# Patient Record
Sex: FEMALE | Race: WHITE | NOT HISPANIC OR LATINO
[De-identification: names, ages, dates, MRNs, and addresses within clinical notes are randomized per-mention and may not be internally consistent; named-entity substitution may affect disease eponyms.]

---

## 2017-09-21 ENCOUNTER — RESULT REVIEW (OUTPATIENT)
Age: 62
End: 2017-09-21

## 2019-04-17 ENCOUNTER — RESULT REVIEW (OUTPATIENT)
Age: 64
End: 2019-04-17

## 2020-01-29 PROBLEM — Z00.00 ENCOUNTER FOR PREVENTIVE HEALTH EXAMINATION: Status: ACTIVE | Noted: 2020-01-29

## 2020-02-21 ENCOUNTER — APPOINTMENT (OUTPATIENT)
Dept: GYNECOLOGIC ONCOLOGY | Facility: CLINIC | Age: 65
End: 2020-02-21
Payer: MEDICARE

## 2020-02-21 VITALS
HEIGHT: 66 IN | WEIGHT: 164 LBS | SYSTOLIC BLOOD PRESSURE: 124 MMHG | TEMPERATURE: 97.9 F | BODY MASS INDEX: 26.36 KG/M2 | DIASTOLIC BLOOD PRESSURE: 80 MMHG

## 2020-02-21 DIAGNOSIS — Z78.9 OTHER SPECIFIED HEALTH STATUS: ICD-10-CM

## 2020-02-21 PROCEDURE — 99204 OFFICE O/P NEW MOD 45 MIN: CPT

## 2020-02-21 NOTE — DISCUSSION/SUMMARY
[FreeTextEntry1] : 64-year-old  ( x3), with atypical endometrial hyperplasia, now referred by Dr. Ibanez for surgical management. The patient is with history of right breast cancer. She status post lumpectomy and radiation. She is also on Aromatase inhibitors. She is requesting surgical management.\par The patient was also encouraged to have BRCA1,2 testing for the benefits of her kids.\par \par Options for surgical management discussed. Procedures offered patient included laparoscopic hysterectomy with bilateral salpingo-oophorectomy. She is requesting to preserve her ovaries if normal and is opting for  a TLH/BS.\par \par The risk benefits and alternative to the recommended treatments were discussed. She was informed about potential complications of surgery.  \par She showed clear understanding, was given the opportunity to ask questions and is amenable to the above surgical treatment. The patient will be setup for the above procedure in the near future.\par

## 2020-02-21 NOTE — OB HISTORY
[Total Preg ___] : : [unfilled] [Living ___] : [unfilled] (living) [Full Term ___] : [unfilled] (full-term) [Vaginal ___] : [unfilled] vaginal delivery(s) [Menopause  Age ____] : menopause occurred at age [unfilled] [Menarche Age ____] : age at menarche was [unfilled]

## 2020-02-21 NOTE — HISTORY OF PRESENT ILLNESS
[FreeTextEntry1] : 64-year-old  ( x3), with atypical endometrial hyperplasia, now referred by Dr. Ibanez for surgical management. The patient is with history of right breast cancer. She status post lumpectomy and radiation. She is also on Aromatase inhibitors. She is requesting surgical management.\par \par

## 2020-06-01 ENCOUNTER — OUTPATIENT (OUTPATIENT)
Dept: OUTPATIENT SERVICES | Facility: HOSPITAL | Age: 65
LOS: 1 days | Discharge: HOME | End: 2020-06-01
Payer: MEDICARE

## 2020-06-01 VITALS
TEMPERATURE: 97 F | OXYGEN SATURATION: 100 % | HEART RATE: 68 BPM | WEIGHT: 169.76 LBS | RESPIRATION RATE: 16 BRPM | DIASTOLIC BLOOD PRESSURE: 76 MMHG | HEIGHT: 66 IN | SYSTOLIC BLOOD PRESSURE: 142 MMHG

## 2020-06-01 DIAGNOSIS — Z85.3 PERSONAL HISTORY OF MALIGNANT NEOPLASM OF BREAST: ICD-10-CM

## 2020-06-01 DIAGNOSIS — Z01.818 ENCOUNTER FOR OTHER PREPROCEDURAL EXAMINATION: ICD-10-CM

## 2020-06-01 DIAGNOSIS — N85.00 ENDOMETRIAL HYPERPLASIA, UNSPECIFIED: ICD-10-CM

## 2020-06-01 DIAGNOSIS — Z98.890 OTHER SPECIFIED POSTPROCEDURAL STATES: Chronic | ICD-10-CM

## 2020-06-01 LAB
A1C WITH ESTIMATED AVERAGE GLUCOSE RESULT: 5.2 % — SIGNIFICANT CHANGE UP (ref 4–5.6)
ALBUMIN SERPL ELPH-MCNC: 4.7 G/DL — SIGNIFICANT CHANGE UP (ref 3.5–5.2)
ALP SERPL-CCNC: 87 U/L — SIGNIFICANT CHANGE UP (ref 30–115)
ALT FLD-CCNC: 31 U/L — SIGNIFICANT CHANGE UP (ref 0–41)
ANION GAP SERPL CALC-SCNC: 13 MMOL/L — SIGNIFICANT CHANGE UP (ref 7–14)
APPEARANCE UR: CLEAR — SIGNIFICANT CHANGE UP
APTT BLD: 29.8 SEC — SIGNIFICANT CHANGE UP (ref 27–39.2)
AST SERPL-CCNC: 23 U/L — SIGNIFICANT CHANGE UP (ref 0–41)
BASOPHILS # BLD AUTO: 0.06 K/UL — SIGNIFICANT CHANGE UP (ref 0–0.2)
BASOPHILS NFR BLD AUTO: 1.1 % — HIGH (ref 0–1)
BILIRUB SERPL-MCNC: 0.2 MG/DL — SIGNIFICANT CHANGE UP (ref 0.2–1.2)
BILIRUB UR-MCNC: NEGATIVE — SIGNIFICANT CHANGE UP
BLD GP AB SCN SERPL QL: SIGNIFICANT CHANGE UP
BUN SERPL-MCNC: 19 MG/DL — SIGNIFICANT CHANGE UP (ref 10–20)
CALCIUM SERPL-MCNC: 9.4 MG/DL — SIGNIFICANT CHANGE UP (ref 8.5–10.1)
CHLORIDE SERPL-SCNC: 99 MMOL/L — SIGNIFICANT CHANGE UP (ref 98–110)
CO2 SERPL-SCNC: 26 MMOL/L — SIGNIFICANT CHANGE UP (ref 17–32)
COLOR SPEC: YELLOW — SIGNIFICANT CHANGE UP
CREAT SERPL-MCNC: 0.7 MG/DL — SIGNIFICANT CHANGE UP (ref 0.7–1.5)
DIFF PNL FLD: NEGATIVE — SIGNIFICANT CHANGE UP
EOSINOPHIL # BLD AUTO: 0.12 K/UL — SIGNIFICANT CHANGE UP (ref 0–0.7)
EOSINOPHIL NFR BLD AUTO: 2.1 % — SIGNIFICANT CHANGE UP (ref 0–8)
ESTIMATED AVERAGE GLUCOSE: 103 MG/DL — SIGNIFICANT CHANGE UP (ref 68–114)
GLUCOSE SERPL-MCNC: 88 MG/DL — SIGNIFICANT CHANGE UP (ref 70–99)
GLUCOSE UR QL: NEGATIVE — SIGNIFICANT CHANGE UP
HCT VFR BLD CALC: 47 % — SIGNIFICANT CHANGE UP (ref 37–47)
HGB BLD-MCNC: 15.4 G/DL — SIGNIFICANT CHANGE UP (ref 12–16)
IMM GRANULOCYTES NFR BLD AUTO: 0.5 % — HIGH (ref 0.1–0.3)
INR BLD: 0.9 RATIO — SIGNIFICANT CHANGE UP (ref 0.65–1.3)
KETONES UR-MCNC: NEGATIVE — SIGNIFICANT CHANGE UP
LEUKOCYTE ESTERASE UR-ACNC: NEGATIVE — SIGNIFICANT CHANGE UP
LYMPHOCYTES # BLD AUTO: 1.24 K/UL — SIGNIFICANT CHANGE UP (ref 1.2–3.4)
LYMPHOCYTES # BLD AUTO: 21.9 % — SIGNIFICANT CHANGE UP (ref 20.5–51.1)
MCHC RBC-ENTMCNC: 32.2 PG — HIGH (ref 27–31)
MCHC RBC-ENTMCNC: 32.8 G/DL — SIGNIFICANT CHANGE UP (ref 32–37)
MCV RBC AUTO: 98.3 FL — SIGNIFICANT CHANGE UP (ref 81–99)
MONOCYTES # BLD AUTO: 0.39 K/UL — SIGNIFICANT CHANGE UP (ref 0.1–0.6)
MONOCYTES NFR BLD AUTO: 6.9 % — SIGNIFICANT CHANGE UP (ref 1.7–9.3)
NEUTROPHILS # BLD AUTO: 3.81 K/UL — SIGNIFICANT CHANGE UP (ref 1.4–6.5)
NEUTROPHILS NFR BLD AUTO: 67.5 % — SIGNIFICANT CHANGE UP (ref 42.2–75.2)
NITRITE UR-MCNC: NEGATIVE — SIGNIFICANT CHANGE UP
NRBC # BLD: 0 /100 WBCS — SIGNIFICANT CHANGE UP (ref 0–0)
PH UR: 6 — SIGNIFICANT CHANGE UP (ref 5–8)
PLATELET # BLD AUTO: 248 K/UL — SIGNIFICANT CHANGE UP (ref 130–400)
POTASSIUM SERPL-MCNC: 4.3 MMOL/L — SIGNIFICANT CHANGE UP (ref 3.5–5)
POTASSIUM SERPL-SCNC: 4.3 MMOL/L — SIGNIFICANT CHANGE UP (ref 3.5–5)
PROT SERPL-MCNC: 7.6 G/DL — SIGNIFICANT CHANGE UP (ref 6–8)
PROT UR-MCNC: NEGATIVE — SIGNIFICANT CHANGE UP
PROTHROM AB SERPL-ACNC: 10.4 SEC — SIGNIFICANT CHANGE UP (ref 9.95–12.87)
RBC # BLD: 4.78 M/UL — SIGNIFICANT CHANGE UP (ref 4.2–5.4)
RBC # FLD: 12.3 % — SIGNIFICANT CHANGE UP (ref 11.5–14.5)
SODIUM SERPL-SCNC: 138 MMOL/L — SIGNIFICANT CHANGE UP (ref 135–146)
SP GR SPEC: 1.02 — SIGNIFICANT CHANGE UP (ref 1.01–1.02)
UROBILINOGEN FLD QL: SIGNIFICANT CHANGE UP
WBC # BLD: 5.65 K/UL — SIGNIFICANT CHANGE UP (ref 4.8–10.8)
WBC # FLD AUTO: 5.65 K/UL — SIGNIFICANT CHANGE UP (ref 4.8–10.8)

## 2020-06-01 PROCEDURE — 71046 X-RAY EXAM CHEST 2 VIEWS: CPT | Mod: 26

## 2020-06-01 PROCEDURE — 93010 ELECTROCARDIOGRAM REPORT: CPT

## 2020-06-01 NOTE — H&P PST ADULT - ATTENDING COMMENTS
64-year-old  ( x3), with atypical endometrial hyperplasia, now admitted for further management. The patient is with history of right breast cancer. She is status post lumpectomy and radiation. She is requesting surgical management. Options for surgical management discussed. Procedures offered patient included laparoscopic hysterectomy with bilateral salpingo-oophorectomy. The risk benefits and alternative to the recommended treatments were discussed. She was informed about potential complications of surgery.   She showed clear understanding, was given the opportunity to ask questions and is amenable to the above surgical treatment.

## 2020-06-01 NOTE — H&P PST ADULT - REASON FOR ADMISSION
64 yr old female to past for total abdominal hysterectomy bilateral salpingooophorectomies frozen section h/o endometiral hyperplasia s/p d and c at [facility]

## 2020-06-01 NOTE — H&P PST ADULT - HISTORY OF PRESENT ILLNESS
pt with h/o right breast dcis s/p right lumpectomy 2019 s/p rad tx   on anastrozole 6 mos. Pt lmp 10 yrs   x 3. Pt denies any fever cough uri uti cp palpitations sob no n/v/d  or pain. Pt stated exercise tolerance is > 3 flights no changes walks daily. Vishnu screen revd negative. Pt for covid test preop 20 has appt pt denies any s/s covid or contact with known positive pts states she has been using ppe and practicing soc distancing states she feels in optimal level of health at this time for this procedure. Pt states saw pmd 4 mos ago and sent text to provider re surgery and will see pre op as requested by PMD

## 2020-06-01 NOTE — H&P PST ADULT - NSICDXPASTMEDICALHX_GEN_ALL_CORE_FT
PAST MEDICAL HISTORY:  Breast cancer 2019 rt lumpectomy dcis s/p rt on anastozole    HTN (hypertension) 2 yr    Osteoporosis

## 2020-06-01 NOTE — H&P PST ADULT - NSICDXPASTSURGICALHX_GEN_ALL_CORE_FT
PAST SURGICAL HISTORY:  History of dilatation and curettage 2020    S/P lumpectomy, right breast 2019

## 2020-06-02 LAB
CULTURE RESULTS: SIGNIFICANT CHANGE UP
SPECIMEN SOURCE: SIGNIFICANT CHANGE UP

## 2020-06-08 ENCOUNTER — OUTPATIENT (OUTPATIENT)
Dept: OUTPATIENT SERVICES | Facility: HOSPITAL | Age: 65
LOS: 1 days | Discharge: HOME | End: 2020-06-08
Payer: MEDICARE

## 2020-06-08 ENCOUNTER — APPOINTMENT (OUTPATIENT)
Dept: GYNECOLOGIC ONCOLOGY | Facility: HOSPITAL | Age: 65
End: 2020-06-08
Payer: MEDICARE

## 2020-06-08 ENCOUNTER — TRANSCRIPTION ENCOUNTER (OUTPATIENT)
Age: 65
End: 2020-06-08

## 2020-06-08 ENCOUNTER — RESULT REVIEW (OUTPATIENT)
Age: 65
End: 2020-06-08

## 2020-06-08 VITALS — WEIGHT: 164.91 LBS | HEIGHT: 65 IN

## 2020-06-08 VITALS
OXYGEN SATURATION: 99 % | RESPIRATION RATE: 18 BRPM | TEMPERATURE: 98 F | HEART RATE: 69 BPM | DIASTOLIC BLOOD PRESSURE: 58 MMHG | SYSTOLIC BLOOD PRESSURE: 110 MMHG

## 2020-06-08 DIAGNOSIS — Z98.890 OTHER SPECIFIED POSTPROCEDURAL STATES: Chronic | ICD-10-CM

## 2020-06-08 LAB
ABO RH CONFIRMATION: SIGNIFICANT CHANGE UP
GLUCOSE BLDC GLUCOMTR-MCNC: 176 MG/DL — HIGH (ref 70–99)
GLUCOSE BLDC GLUCOMTR-MCNC: 80 MG/DL — SIGNIFICANT CHANGE UP (ref 70–99)

## 2020-06-08 PROCEDURE — 58660 LAPAROSCOPY LYSIS: CPT

## 2020-06-08 PROCEDURE — 58571 TLH W/T/O 250 G OR LESS: CPT

## 2020-06-08 PROCEDURE — 88307 TISSUE EXAM BY PATHOLOGIST: CPT | Mod: 26

## 2020-06-08 PROCEDURE — 57283 COLPOPEXY INTRAPERITONEAL: CPT

## 2020-06-08 RX ORDER — AMLODIPINE BESYLATE AND OLMESARTRAN MEDOXOMIL 10; 40 MG/1; MG/1
1 TABLET, FILM COATED ORAL
Qty: 0 | Refills: 0 | DISCHARGE

## 2020-06-08 RX ORDER — SIMETHICONE 80 MG/1
1 TABLET, CHEWABLE ORAL
Qty: 180 | Refills: 0
Start: 2020-06-08 | End: 2020-07-07

## 2020-06-08 RX ORDER — HYDROMORPHONE HYDROCHLORIDE 2 MG/ML
0.5 INJECTION INTRAMUSCULAR; INTRAVENOUS; SUBCUTANEOUS
Refills: 0 | Status: DISCONTINUED | OUTPATIENT
Start: 2020-06-08 | End: 2020-06-08

## 2020-06-08 RX ORDER — FLUTICASONE PROPIONATE 50 MCG
2 SPRAY, SUSPENSION NASAL
Qty: 0 | Refills: 0 | DISCHARGE

## 2020-06-08 RX ORDER — DOCUSATE SODIUM 100 MG
1 CAPSULE ORAL
Qty: 60 | Refills: 0
Start: 2020-06-08

## 2020-06-08 RX ORDER — ACETAMINOPHEN 500 MG
1000 TABLET ORAL ONCE
Refills: 0 | Status: COMPLETED | OUTPATIENT
Start: 2020-06-08 | End: 2020-06-08

## 2020-06-08 RX ORDER — ACETAMINOPHEN 500 MG
2 TABLET ORAL
Qty: 240 | Refills: 0
Start: 2020-06-08 | End: 2020-07-07

## 2020-06-08 RX ORDER — DENOSUMAB 60 MG/ML
0 INJECTION SUBCUTANEOUS
Qty: 0 | Refills: 0 | DISCHARGE

## 2020-06-08 RX ORDER — ANASTROZOLE 1 MG/1
1 TABLET ORAL
Qty: 0 | Refills: 0 | DISCHARGE

## 2020-06-08 RX ORDER — GLUCOSAMINE/CHONDROITIN/C/MANG 500-400 MG
3 CAPSULE ORAL
Qty: 0 | Refills: 0 | DISCHARGE

## 2020-06-08 RX ORDER — SODIUM CHLORIDE 9 MG/ML
1000 INJECTION, SOLUTION INTRAVENOUS
Refills: 0 | Status: DISCONTINUED | OUTPATIENT
Start: 2020-06-08 | End: 2020-06-08

## 2020-06-08 RX ORDER — ONDANSETRON 8 MG/1
8 TABLET, FILM COATED ORAL ONCE
Refills: 0 | Status: DISCONTINUED | OUTPATIENT
Start: 2020-06-08 | End: 2020-06-08

## 2020-06-08 RX ORDER — CETIRIZINE HYDROCHLORIDE 10 MG/1
1 TABLET ORAL
Qty: 0 | Refills: 0 | DISCHARGE

## 2020-06-08 RX ORDER — OXYCODONE HYDROCHLORIDE 5 MG/1
1 TABLET ORAL
Qty: 10 | Refills: 0
Start: 2020-06-08

## 2020-06-08 RX ADMIN — SODIUM CHLORIDE 75 MILLILITER(S): 9 INJECTION, SOLUTION INTRAVENOUS at 11:58

## 2020-06-08 RX ADMIN — Medication 400 MILLIGRAM(S): at 11:45

## 2020-06-08 NOTE — BRIEF OPERATIVE NOTE - OPERATION/FINDINGS
exam under anesthesia: anteverted uterus sounded to 7cm   On laparoscopy: anteverted uterus with fibroids; B/L tubes/ovaries appeared normal. Cul-de-sac appeared normal.   Frozen section: benign pathology exam under anesthesia: anteverted uterus sounded to 7cm   On laparoscopy: anteverted uterus with small subserosal fibroids; B/L tubes/ovaries appeared normal. Cul-de-sac appeared normal.   Frozen section: benign pathology

## 2020-06-08 NOTE — DISCHARGE NOTE PROVIDER - NSDCMRMEDTOKEN_GEN_ALL_CORE_FT
Colace 100 mg oral capsule: 1 cap(s) orally 2 times a day   oxyCODONE 5 mg oral capsule: 1 cap(s) orally every 4 hours, As Needed -for severe pain MDD:6 tablets  simethicone 80 mg oral tablet: 1 tab(s) orally every 4 hours   Tylenol 325 mg oral tablet: 2 tab(s) orally every 6 hours

## 2020-06-08 NOTE — DISCHARGE NOTE PROVIDER - NSDCCPCAREPLAN_GEN_ALL_CORE_FT
PRINCIPAL DISCHARGE DIAGNOSIS  Diagnosis: Endometrial hyperplasia with atypia  Assessment and Plan of Treatment:

## 2020-06-08 NOTE — ASU DISCHARGE PLAN (ADULT/PEDIATRIC) - CARE PROVIDER_API CALL
[facility]  [address] [place] [address]  Phone: [phone]  Fax: [phone]  Established Patient  Follow Up Time: 1 week

## 2020-06-08 NOTE — BRIEF OPERATIVE NOTE - SPECIMENS
uterus with bilateral fallopian tubes and ovaries uterus, cervix, bilateral fallopian tubes and ovaries

## 2020-06-08 NOTE — DISCHARGE NOTE PROVIDER - HOSPITAL COURSE
Patient underwent an uncomplicated laparoscopic hysterectomy, bilateral salpingo-oophorectomy. She met criteria for discharged and was sent home POD 0 after voiding and ambulating and tolerating PO.

## 2020-06-08 NOTE — ASU DISCHARGE PLAN (ADULT/PEDIATRIC) - CLICK TO LAUNCH ORM
Pt went to the bathroom- unable to get urine sample but did have a BM. Pt is feeling better.  notified and states it is okay to go without the urine sample. Pt is not having any burning with urination.       Archana Bear RN  04/14/19 [id number]
.

## 2020-06-08 NOTE — BRIEF OPERATIVE NOTE - NSICDXBRIEFPROCEDURE_GEN_ALL_CORE_FT
PROCEDURES:  Lysis of adhesions, pelvic 08-Jun-2020 11:39:09  Ruth Ann Paredes  Uterosacral colpopexy 08-Jun-2020 11:38:55  Ruth Ann Paredes  Laparoscopic hysterectomy, total, with BSO, uterus 250 g or less 08-Jun-2020 11:38:45  Ruth Ann Paredes

## 2020-06-08 NOTE — DISCHARGE NOTE PROVIDER - NSDCCPTREATMENT_GEN_ALL_CORE_FT
PRINCIPAL PROCEDURE  Procedure: Laparoscopic hysterectomy, total, with BSO, uterus 250 g or less  Findings and Treatment:

## 2020-06-08 NOTE — CHART NOTE - NSCHARTNOTEFT_GEN_A_CORE
PACU ANESTHESIA ADMISSION NOTE      Procedure: Lysis of adhesions, pelvic  Uterosacral colpopexy  Laparoscopic hysterectomy, total, with BSO, uterus 250 g or less    Post op diagnosis:  Atypical endometrial hyperplasia      ____  Intubated  TV:______       Rate: ______      FiO2: ______    __x__  Patent Airway    __x__  Full return of protective reflexes    __x__  Full recovery from anesthesia / back to baseline status    Vitals:  HR: 75  BP: 111/59  RR: 18  O2 Sat: 98  Temp: 97    Mental Status:  _x___ Awake   _____ Alert   __x___ Drowsy   _____ Sedated    Nausea/Vomiting:  _x__ NO  ______Yes,   See Post - Op Orders          Pain Scale (0-10):  __0___    Treatment: ____ None    ____ See Post - Op/PCA Orders    Post - Operative Fluids:   ____ Oral   ____ See Post - Op Orders    Plan: Discharge:   ___x_Home       _____Floor     _____Critical Care   Other:_________________    Comments: Patient had smooth intraoperative event, no anesthesia complication.

## 2020-06-08 NOTE — DISCHARGE NOTE PROVIDER - NSDCFUADDINST_GEN_ALL_CORE_FT
No heavy lifting.   Nothing inside the vagina for 6 weeks: no tampons, douching, sexual intercourse, tub baths or pools.   If you have a fever over 100.4F, severe abdominal pain or heavy vaginal bleeding please call your doctor or go to the emergency room.  Please follow up with your surgeon in 2 weeks for a postoperative visit.

## 2020-06-08 NOTE — PRE-ANESTHESIA EVALUATION ADULT - NSANTHADDINFOFT_GEN_ALL_CORE
Procedure/Risks explained for GA with ETT + routine monitoring. Patient undersstands and agrees to proceed as planned.

## 2020-06-08 NOTE — DISCHARGE NOTE PROVIDER - CARE PROVIDER_API CALL
[facility]  [address] [place] [id number]  Phone: [phone]  Fax: [phone]  Follow Up Time: 2 weeks

## 2020-06-09 LAB — SURGICAL PATHOLOGY STUDY: SIGNIFICANT CHANGE UP

## 2020-06-10 DIAGNOSIS — D25.2 SUBSEROSAL LEIOMYOMA OF UTERUS: ICD-10-CM

## 2020-06-10 DIAGNOSIS — N80.0 ENDOMETRIOSIS OF UTERUS: ICD-10-CM

## 2020-06-10 DIAGNOSIS — Z85.3 PERSONAL HISTORY OF MALIGNANT NEOPLASM OF BREAST: ICD-10-CM

## 2020-06-10 DIAGNOSIS — Z88.0 ALLERGY STATUS TO PENICILLIN: ICD-10-CM

## 2020-06-10 DIAGNOSIS — Z88.2 ALLERGY STATUS TO SULFONAMIDES: ICD-10-CM

## 2020-06-10 DIAGNOSIS — N85.02 ENDOMETRIAL INTRAEPITHELIAL NEOPLASIA [EIN]: ICD-10-CM

## 2020-06-10 DIAGNOSIS — N85.8 OTHER SPECIFIED NONINFLAMMATORY DISORDERS OF UTERUS: ICD-10-CM

## 2020-06-10 DIAGNOSIS — D25.0 SUBMUCOUS LEIOMYOMA OF UTERUS: ICD-10-CM

## 2020-06-10 DIAGNOSIS — I10 ESSENTIAL (PRIMARY) HYPERTENSION: ICD-10-CM

## 2020-06-10 DIAGNOSIS — D25.1 INTRAMURAL LEIOMYOMA OF UTERUS: ICD-10-CM

## 2020-06-17 PROBLEM — I10 ESSENTIAL (PRIMARY) HYPERTENSION: Chronic | Status: ACTIVE | Noted: 2020-06-01

## 2020-06-17 PROBLEM — C50.919 MALIGNANT NEOPLASM OF UNSPECIFIED SITE OF UNSPECIFIED FEMALE BREAST: Chronic | Status: ACTIVE | Noted: 2020-06-01

## 2020-06-17 PROBLEM — M81.0 AGE-RELATED OSTEOPOROSIS WITHOUT CURRENT PATHOLOGICAL FRACTURE: Chronic | Status: ACTIVE | Noted: 2020-06-01

## 2020-06-23 ENCOUNTER — APPOINTMENT (OUTPATIENT)
Dept: GYNECOLOGIC ONCOLOGY | Facility: CLINIC | Age: 65
End: 2020-06-23
Payer: MEDICARE

## 2020-06-23 VITALS
BODY MASS INDEX: 26.36 KG/M2 | TEMPERATURE: 98.9 F | SYSTOLIC BLOOD PRESSURE: 128 MMHG | DIASTOLIC BLOOD PRESSURE: 80 MMHG | WEIGHT: 164 LBS | HEIGHT: 66 IN

## 2020-06-23 PROCEDURE — 99024 POSTOP FOLLOW-UP VISIT: CPT

## 2020-06-23 NOTE — ASSESSMENT
[FreeTextEntry1] : 64-year-old  ( x3), with atypical endometrial hyperplasia,  referred by Dr. Ibanez for surgical management. The patient is with history of right breast cancer. She status post lumpectomy and radiation. She is also on Aromatase inhibitors. She is s/p surgical management with a TLH/BSO on 2020.  She appears to be recovering well.  Her Path report is benign. \par \par Final Diagnosis\par Uterus, cervix, bilateral tubes and ovaries, laparoscopic total\par abdominal hysterectomy, bilateral salpingo-oophorectomy:\par -  Atrophic endometrium.\par -  Adenomyosis.\par -  Multiple leiomyomas, intramural, submucosal and subserosal,\par with degenerative changes.\par -  No evidence of endometrial hyperplasia in the entirely\par submitted endometrial lining.\par -  Cervix without significant diagnostic change.\par -  Bilateral ovaries and fallopian tubes without significant\par diagnostic change (see comment).\par \par Comment:  The grossly described lesion of the right ovary\par corresponds to\par hyalinized corpora albicantia with calcifications.\par \par Verified by: Melissa Hallman M.D.\par

## 2020-06-23 NOTE — LETTER BODY
[I recently saw our patient [unfilled] for a follow-up visit.] : I recently saw our patient, [unfilled] for a follow-up visit. [Dear  ___] : Dear  [unfilled], [Attached please find my note.] : Attached please find my note.

## 2020-06-23 NOTE — DISCUSSION/SUMMARY
[Clean] : was clean [Dry] : was dry [Intact] : was intact [Erythema] : was not erythematous [Ecchymosis] : was not ecchymotic [Seroma] : had no seroma [None] : had no drainage [Firm] : soft [Normal Skin] : normal appearance [Abnormal Bowel Sounds] : normal bowel sounds [Tender] : nontender [Guarding] : no guarding [Rebound] : no rebound tenderness [Incisional Hernia] : no incisional hernia [External Genitalia Abnormal] : normal external genitalia [Vaginal Exam Abnormal] : normal vaginal exam [Doing Well] : is doing well [Excellent Pain Control] : has excellent pain control [0] : 0

## 2020-06-23 NOTE — REASON FOR VISIT
[Post Op] : post op visit [de-identified] : 6/8/20 [de-identified] : TLH/BSO [de-identified] : Underwent risk reducing TLH/BSO on May 8, 2020 with benign pathology.

## 2020-07-23 ENCOUNTER — APPOINTMENT (OUTPATIENT)
Dept: OBGYN | Facility: CLINIC | Age: 65
End: 2020-07-23
Payer: MEDICARE

## 2020-07-23 PROCEDURE — 81002 URINALYSIS NONAUTO W/O SCOPE: CPT

## 2020-07-23 PROCEDURE — 99024 POSTOP FOLLOW-UP VISIT: CPT

## 2020-09-15 ENCOUNTER — APPOINTMENT (OUTPATIENT)
Dept: GYNECOLOGIC ONCOLOGY | Facility: CLINIC | Age: 65
End: 2020-09-15
Payer: MEDICARE

## 2020-09-15 VITALS
SYSTOLIC BLOOD PRESSURE: 126 MMHG | WEIGHT: 164 LBS | BODY MASS INDEX: 26.36 KG/M2 | DIASTOLIC BLOOD PRESSURE: 82 MMHG | HEIGHT: 66 IN | TEMPERATURE: 97.4 F

## 2020-09-15 PROCEDURE — 99213 OFFICE O/P EST LOW 20 MIN: CPT

## 2020-09-15 NOTE — HISTORY OF PRESENT ILLNESS
[FreeTextEntry1] : 65 year old patient of Dr. Ibanez here for followup.  History of right breast cancer.   S/P TLH/BSO for fibroids, endometrial hyperplasia and  risk reduction. Pathology benign.  Patient continues to take Arimidex tabs.  No c/o vaginal bleeding, discharge or pain.   Returns to office for health maintenance visit.

## 2020-09-15 NOTE — LETTER BODY
[Dear  ___] : Dear  [unfilled], [Attached please find my note.] : Attached please find my note. [I recently saw our patient [unfilled] for a follow-up visit.] : I recently saw our patient, [unfilled] for a follow-up visit.

## 2020-09-15 NOTE — ASSESSMENT
[FreeTextEntry1] : MARKELL MÉNDEZ Underwent risk reducing TLH/BSO on May 8, 2020 with benign pathology,  she is here for a post op visit . The patient is fully recovered and is referred back to Dr. Ibanez for further management and follow up.\par

## 2021-09-13 NOTE — PAST MEDICAL HISTORY
[Postmenopausal] : The patient is postmenopausal [Menarche Age ____] : age at menarche was [unfilled] [Menopause Age____] : age at menopause was [unfilled] [Approximately ___] : the LMP was approximately [unfilled] [Total Preg ___] : G[unfilled] [Live Births ___] : P[unfilled]  [Full Term ___] : Full Term: [unfilled] [Living ___] : Living: [unfilled]

## 2021-09-17 ENCOUNTER — APPOINTMENT (OUTPATIENT)
Dept: GYNECOLOGIC ONCOLOGY | Facility: CLINIC | Age: 66
End: 2021-09-17
Payer: MEDICARE

## 2021-09-17 VITALS
BODY MASS INDEX: 24.43 KG/M2 | HEIGHT: 66 IN | SYSTOLIC BLOOD PRESSURE: 130 MMHG | WEIGHT: 152 LBS | TEMPERATURE: 96.3 F | DIASTOLIC BLOOD PRESSURE: 72 MMHG

## 2021-09-17 PROCEDURE — 99214 OFFICE O/P EST MOD 30 MIN: CPT

## 2021-09-17 NOTE — ASSESSMENT
[FreeTextEntry1] : 66 year old patient of Dr. Barber Ibanez. History of right breast cancer. S/P risk reducing TLH/BSO on May 8, 2020 with benign pathology. She continues to take Arimidex tabs. Denies any vaginal bleeding, discharge or pain. The patient appears to be doing well with no clinical evidence of disease.\par

## 2021-09-17 NOTE — HISTORY OF PRESENT ILLNESS
[FreeTextEntry1] : 66 year old patient of Dr. Barber Ibanez, with a history of right breast cancer,  S/P risk reducing TLH/BSO on May 8, 2020 with benign pathology.  She continues to take Arimidex tabs.   Denies any vaginal bleeding, discharge or pain.  She returns to office for health maintenance visit.

## 2022-07-29 NOTE — ASU PATIENT PROFILE, ADULT - NS PRO LAST MENSTRUAL
not applicable Cyclosporine Counseling:  I discussed with the patient the risks of cyclosporine including but not limited to hypertension, gingival hyperplasia,myelosuppression, immunosuppression, liver damage, kidney damage, neurotoxicity, lymphoma, and serious infections. The patient understands that monitoring is required including baseline blood pressure, CBC, CMP, lipid panel and uric acid, and then 1-2 times monthly CMP and blood pressure.

## 2022-09-30 ENCOUNTER — APPOINTMENT (OUTPATIENT)
Dept: UROGYNECOLOGY | Facility: CLINIC | Age: 67
End: 2022-09-30

## 2022-09-30 VITALS
WEIGHT: 158 LBS | DIASTOLIC BLOOD PRESSURE: 73 MMHG | SYSTOLIC BLOOD PRESSURE: 123 MMHG | HEIGHT: 66 IN | HEART RATE: 63 BPM | BODY MASS INDEX: 25.39 KG/M2

## 2022-09-30 DIAGNOSIS — Z85.3 PERSONAL HISTORY OF MALIGNANT NEOPLASM OF BREAST: ICD-10-CM

## 2022-09-30 DIAGNOSIS — I10 ESSENTIAL (PRIMARY) HYPERTENSION: ICD-10-CM

## 2022-09-30 DIAGNOSIS — Z86.19 PERSONAL HISTORY OF OTHER INFECTIOUS AND PARASITIC DISEASES: ICD-10-CM

## 2022-09-30 DIAGNOSIS — Z82.49 FAMILY HISTORY OF ISCHEMIC HEART DISEASE AND OTHER DISEASES OF THE CIRCULATORY SYSTEM: ICD-10-CM

## 2022-09-30 DIAGNOSIS — N85.00 ENDOMETRIAL HYPERPLASIA, UNSPECIFIED: ICD-10-CM

## 2022-09-30 DIAGNOSIS — Z83.42 FAMILY HISTORY OF FAMILIAL HYPERCHOLESTEROLEMIA: ICD-10-CM

## 2022-09-30 DIAGNOSIS — N85.01 BENIGN ENDOMETRIAL HYPERPLASIA: ICD-10-CM

## 2022-09-30 DIAGNOSIS — R30.1 VESICAL TENESMUS: ICD-10-CM

## 2022-09-30 DIAGNOSIS — Z87.440 PERSONAL HISTORY OF URINARY (TRACT) INFECTIONS: ICD-10-CM

## 2022-09-30 DIAGNOSIS — Z90.710 ACQUIRED ABSENCE OF BOTH CERVIX AND UTERUS: ICD-10-CM

## 2022-09-30 DIAGNOSIS — B37.9 CANDIDIASIS, UNSPECIFIED: ICD-10-CM

## 2022-09-30 PROCEDURE — 51701 INSERT BLADDER CATHETER: CPT

## 2022-09-30 PROCEDURE — 99205 OFFICE O/P NEW HI 60 MIN: CPT | Mod: 25

## 2022-09-30 RX ORDER — MULTIVIT-MIN/FA/LYCOPEN/LUTEIN .4-300-25
TABLET ORAL
Refills: 0 | Status: ACTIVE | COMMUNITY

## 2022-09-30 RX ORDER — ANASTROZOLE TABLETS 1 MG/1
1 TABLET ORAL
Refills: 0 | Status: ACTIVE | COMMUNITY

## 2022-09-30 RX ORDER — NYSTATIN 100000 U/G
100000 OINTMENT TOPICAL 3 TIMES DAILY
Qty: 1 | Refills: 1 | Status: COMPLETED | COMMUNITY
Start: 2022-02-10 | End: 2022-09-30

## 2022-09-30 RX ORDER — NYSTATIN 100000 U/G
100000 OINTMENT TOPICAL 3 TIMES DAILY
Qty: 1 | Refills: 1 | Status: COMPLETED | COMMUNITY
Start: 2022-07-09 | End: 2022-09-30

## 2022-09-30 RX ORDER — FLUTICASONE PROPIONATE 50 MCG
SPRAY, SUSPENSION NASAL
Refills: 0 | Status: ACTIVE | COMMUNITY

## 2022-09-30 RX ORDER — ANASTROZOLE 1 MG/1
TABLET ORAL
Refills: 0 | Status: COMPLETED | COMMUNITY
End: 2022-09-30

## 2022-09-30 RX ORDER — CETIRIZINE HCL 10 MG
10 TABLET ORAL
Refills: 0 | Status: ACTIVE | COMMUNITY

## 2022-09-30 RX ORDER — BACILLUS COAGULANS/INULIN 1B-250 MG
CAPSULE ORAL
Refills: 0 | Status: ACTIVE | COMMUNITY

## 2022-09-30 RX ORDER — FLUCONAZOLE 150 MG/1
150 TABLET ORAL DAILY
Qty: 1 | Refills: 2 | Status: COMPLETED | COMMUNITY
Start: 2022-07-09 | End: 2022-09-30

## 2022-09-30 RX ORDER — AMLODIPINE BESYLATE 5 MG/1
5 TABLET ORAL
Refills: 0 | Status: COMPLETED | COMMUNITY
End: 2022-09-30

## 2022-09-30 RX ORDER — ACETAMINOPHEN 325 MG
TABLET ORAL
Refills: 0 | Status: ACTIVE | COMMUNITY

## 2022-09-30 RX ORDER — AMLODIPINE AND OLMESARTAN MEDOXOMIL 10; 20 MG/1; MG/1
10-20 TABLET ORAL
Refills: 0 | Status: ACTIVE | COMMUNITY

## 2022-09-30 RX ORDER — FLUCONAZOLE 150 MG/1
150 TABLET ORAL DAILY
Qty: 1 | Refills: 0 | Status: COMPLETED | COMMUNITY
Start: 2022-02-10 | End: 2022-09-30

## 2022-09-30 RX ORDER — CRANBERRY FRUIT EXTRACT 200 MG
CAPSULE ORAL
Refills: 0 | Status: ACTIVE | COMMUNITY

## 2022-09-30 RX ORDER — CALCIUM CITRATE/VITAMIN D3 315MG-6.25
TABLET ORAL
Refills: 0 | Status: ACTIVE | COMMUNITY

## 2022-09-30 NOTE — COUNSELING
[FreeTextEntry1] : \par We will notify you of the urine results if they are abnormal\par \par Please call the office if you feel like you have an infection so that we can arrange testing of your urine. If you keep getting infections then I will recommend further evaluation of your kidneys and bladder\par \par Please continue to drink at least 6 cups of plain water per day\par \par For 4-5 days, cut everything from the list out of your diet.\par \par After 4-5 days, add one item from the list back into your diet for 4-5 days.\par \par Only put one item back in at a time to see which food is causing you pain.\par \par Please take the azo as needed for the burning.\par \par If you continue to have flares of burning and pain with negative urine cultures, then we will consider starting gabapentin\par \par Schedule 3 month follow up with my PA, Maki (bladder pain)

## 2022-09-30 NOTE — PHYSICAL EXAM
[Chaperone Present] : A chaperone was present in the examining room during all aspects of the physical examination [FreeTextEntry1] : Void: 125 cc\par PVR: 30 cc\par Urethra was prepped in sterile fashion and then a sterile non- indwelling catheter (14F) was used by me to drain the bladder. The patient tolerated the procedure well.\par Indication: history of UTI\par \par Ap: -1  Bp: -1\par  \par Well healed incision: laparoscopic\par normal perineal sensation\par normal perineal reflexes\par - cough stress test\par + atrophy\par + urethral hypermobility\par bilateral levator ani spasm,  no tenderness\par no urethral tenderness\par no bladder tenderness\par no cuff tenderness\par surgically absent uterus and cervix\par 2/5 Kegel\par

## 2022-09-30 NOTE — HISTORY OF PRESENT ILLNESS
[FreeTextEntry1] : \par Pt with pelvic floor dysfunction here for urogynecologic evaluation. She describes: \par \par Chief PFD:  UTIs\par \par UTIs:\par Symptoms: urinary frequency, dysuria, last hematuria years ago, feels better after treatment, not related to sex\par Last symptoms in July 2022 and it was negative\par Last time urine culture was positive was a year ago\par Feels better with increasing her water intake and taking pyridium as needed\par \par Records reviewed:\par 10/4/2021: klebsiella R amp I macrobid\par 7/22/2022: urine dip leukocytes 1+, no urine culture\par 2/24/2022: urine dip: trace blood, trace leukocytes, nitrite 3+, no urine culture\par \par Allergies:\par PCN/Keflex: hives\par Sulfa:hives\par \par Pelvic organ prolapse: s/p laparoscopic hysterectomy/BS0 (risk reduction), no bulge, denies splinting\par Stress urinary incontinence: denies\par Overactive bladder syndrome: denies\par Voiding dysfunction: Incomplete bladder emptying, hesitancy \par Lower urinary tract/vaginal symptoms: as above UTIs per year, no hematuria, no dysuria, no bladder pain \par Fecal incontinence: denies\par Defecatory dysfunction: sausage\par Sexual dysfunction: min active, bothersome dryness \par Pelvic pain: denies\par Vaginal dryness: hx of breast cancer, on arimidex, yes dryness\par \par Her pelvic floor symptoms are significantly bothersome and negatively impacting her quality of life. \par \par

## 2022-09-30 NOTE — DISCUSSION/SUMMARY
[FreeTextEntry1] : \par History of UTI-\par Advised the patient that recurrent UTIs are defined as having 3 or more positive urine culture in 1 year or 2 or more in 6 months, which she has not had. Advised to call the office if she feels like she has an infection so that we can arrange testing of her urine. If she keeps getting infections then I will recommend a workup of further evaluation of her kidneys and bladder and further prevention treatment including estrogen vaginal cream and daily antibiotic suppression. The patient voiced understanding and agrees with the plan.\par \par Atrophic vaginitis-\par We reviewed the risks, benefits, alternatives and indications of local estrogen therapy. She does NOT opt to begin this therapy. \par \par Painful bladder spasms-\par We reviewed the management options for interstitial cystitis/painful bladder syndrome, including dietary modification, over the counter medications for symptom relief, other medications including neurontin. The patient voiced understanding and agrees to continue with diet changes, azo as needed and if she continues to get flares she would then want to start neurontin.\par

## 2022-10-02 LAB
APPEARANCE: CLEAR
BACTERIA: NEGATIVE
BILIRUBIN URINE: NEGATIVE
BLOOD URINE: NEGATIVE
COLOR: NORMAL
GLUCOSE QUALITATIVE U: NEGATIVE
HYALINE CASTS: 0 /LPF
KETONES URINE: ABNORMAL
LEUKOCYTE ESTERASE URINE: NEGATIVE
MICROSCOPIC-UA: NORMAL
NITRITE URINE: NEGATIVE
PH URINE: 6
PROTEIN URINE: NEGATIVE
RED BLOOD CELLS URINE: 1 /HPF
SPECIFIC GRAVITY URINE: 1.02
SQUAMOUS EPITHELIAL CELLS: 0 /HPF
UROBILINOGEN URINE: NORMAL
WHITE BLOOD CELLS URINE: 1 /HPF

## 2022-10-03 ENCOUNTER — APPOINTMENT (OUTPATIENT)
Dept: OTOLARYNGOLOGY | Facility: CLINIC | Age: 67
End: 2022-10-03

## 2022-10-03 VITALS — WEIGHT: 158 LBS | HEIGHT: 66 IN | BODY MASS INDEX: 25.39 KG/M2

## 2022-10-03 LAB — URINE CULTURE <10: NORMAL

## 2022-10-03 PROCEDURE — 99205 OFFICE O/P NEW HI 60 MIN: CPT | Mod: 25

## 2022-10-03 PROCEDURE — 69210 REMOVE IMPACTED EAR WAX UNI: CPT

## 2022-10-03 NOTE — ASSESSMENT
[FreeTextEntry1] : I reviewed, interpreted, and discussed the Audiogram done on 8/1/22. Asymmetric snhl.\par \par \par

## 2022-10-03 NOTE — HISTORY OF PRESENT ILLNESS
[de-identified] : Patient presents today c/o left ear hearing loss .  Patient has been gradually having hearing loss in left ear .  Patient has a Audiogram done 08/01/22.  The report showed significant loss of hearing in left ear.  Patient denies any pain.  She also is here for clearance for a hearing aid.

## 2022-10-03 NOTE — PHYSICAL EXAM
[de-identified] : bilateral impacted wax cleaned with curette [Normal] : mucosa is normal [Midline] : trachea located in midline position

## 2023-01-13 ENCOUNTER — NON-APPOINTMENT (OUTPATIENT)
Age: 68
End: 2023-01-13

## 2023-04-04 ENCOUNTER — NON-APPOINTMENT (OUTPATIENT)
Age: 68
End: 2023-04-04

## 2023-04-06 ENCOUNTER — NON-APPOINTMENT (OUTPATIENT)
Age: 68
End: 2023-04-06

## 2023-04-06 RX ORDER — FLUCONAZOLE 150 MG/1
150 TABLET ORAL
Qty: 2 | Refills: 0 | Status: ACTIVE | COMMUNITY
Start: 2023-04-06 | End: 1900-01-01

## 2023-05-23 ENCOUNTER — NON-APPOINTMENT (OUTPATIENT)
Age: 68
End: 2023-05-23

## 2023-06-16 ENCOUNTER — APPOINTMENT (OUTPATIENT)
Dept: OBGYN | Facility: CLINIC | Age: 68
End: 2023-06-16
Payer: MEDICARE

## 2023-06-16 DIAGNOSIS — Z01.419 ENCOUNTER FOR GYNECOLOGICAL EXAMINATION (GENERAL) (ROUTINE) W/OUT ABNORMAL FINDINGS: ICD-10-CM

## 2023-06-16 DIAGNOSIS — N95.2 POSTMENOPAUSAL ATROPHIC VAGINITIS: ICD-10-CM

## 2023-06-16 DIAGNOSIS — Z85.3 PERSONAL HISTORY OF MALIGNANT NEOPLASM OF BREAST: ICD-10-CM

## 2023-06-16 DIAGNOSIS — Z11.51 ENCOUNTER FOR SCREENING FOR HUMAN PAPILLOMAVIRUS (HPV): ICD-10-CM

## 2023-06-16 LAB
BILIRUB UR QL STRIP: NEGATIVE
CLARITY UR: CLEAR
COLLECTION METHOD: NORMAL
GLUCOSE UR-MCNC: NEGATIVE
HCG UR QL: 0.2 EU/DL
HGB UR QL STRIP.AUTO: NEGATIVE
KETONES UR-MCNC: NEGATIVE
LEUKOCYTE ESTERASE UR QL STRIP: NEGATIVE
NITRITE UR QL STRIP: NEGATIVE
PH UR STRIP: 7
PROT UR STRIP-MCNC: NEGATIVE
SP GR UR STRIP: 1.02

## 2023-06-16 PROCEDURE — 81003 URINALYSIS AUTO W/O SCOPE: CPT | Mod: QW

## 2023-06-16 PROCEDURE — 99397 PER PM REEVAL EST PAT 65+ YR: CPT

## 2023-06-16 RX ORDER — CLOTRIMAZOLE AND BETAMETHASONE DIPROPIONATE 10; .5 MG/G; MG/G
1-0.05 CREAM TOPICAL TWICE DAILY
Qty: 1 | Refills: 1 | Status: ACTIVE | COMMUNITY
Start: 2023-06-16 | End: 1900-01-01

## 2023-06-16 NOTE — PROCEDURE
[Cervical Pap Smear] : cervical Pap smear [Liquid Base] : liquid base [Tolerated Well] : the patient tolerated the procedure well [No Complications] : there were no complications [de-identified] : HPV

## 2023-06-16 NOTE — HISTORY OF PRESENT ILLNESS
[FreeTextEntry1] : Pt PRESENT FOR ANNUAL GYN EXAM. [TextBox_4] : pt presents for annual exam with c/o vaginal irritation on and off \par pt has used diflucan and monostat with some relief\par but needed monostat 3x before she got relief- pt self medicated \par hx of total hysterectomy -atypical hyperplasia \par also with hx of breast ca with right lumpectomy and radiation and f/u with oncologist and radiologist \par pt has also seen uro/gyn for frq uti's  [Mammogramdate] : 2022 [BreastSonogramDate] : 2022 [BoneDensityDate] : 2020 [ColonoscopyDate] : 2023

## 2023-06-16 NOTE — PHYSICAL EXAM
[Chaperone Present] : A chaperone was present in the examining room during all aspects of the physical examination [Appropriately responsive] : appropriately responsive [Alert] : alert [No Acute Distress] : no acute distress [Oriented x3] : oriented x3 [Examination Of The Breasts] : a normal appearance [No Masses] : no breast masses were palpable [Labia Majora] : normal [Labia Minora] : normal [Normal] : normal [Uterine Adnexae] : normal [FreeTextEntry1] : slight redness  [FreeTextEntry4] : no s/s of incection at this time

## 2023-06-16 NOTE — PLAN
[FreeTextEntry1] : annual with pap/hpv \par b/l mammo \par bone density \par ca with vit d/c \par will give pt clotrimazole and betamethasone as directed \par vaginal health d/w/p \par increased po fluids \par f/u oncologist/ radiologist \par rto annually/prn

## 2023-06-21 LAB — CYTOLOGY CVX/VAG DOC THIN PREP: ABNORMAL

## 2023-06-22 ENCOUNTER — APPOINTMENT (OUTPATIENT)
Dept: OBGYN | Facility: CLINIC | Age: 68
End: 2023-06-22

## 2023-07-14 ENCOUNTER — APPOINTMENT (OUTPATIENT)
Dept: UROGYNECOLOGY | Facility: CLINIC | Age: 68
End: 2023-07-14

## 2023-07-27 DIAGNOSIS — N89.8 OTHER SPECIFIED NONINFLAMMATORY DISORDERS OF VAGINA: ICD-10-CM

## 2023-07-27 RX ORDER — FLUCONAZOLE 150 MG/1
150 TABLET ORAL
Qty: 2 | Refills: 2 | Status: ACTIVE | COMMUNITY
Start: 2023-07-27 | End: 1900-01-01

## 2023-07-27 RX ORDER — NITROFURANTOIN (MONOHYDRATE/MACROCRYSTALS) 25; 75 MG/1; MG/1
100 CAPSULE ORAL
Qty: 14 | Refills: 0 | Status: ACTIVE | COMMUNITY
Start: 2023-07-27 | End: 1900-01-01

## 2024-04-29 ENCOUNTER — APPOINTMENT (OUTPATIENT)
Dept: OTOLARYNGOLOGY | Facility: CLINIC | Age: 69
End: 2024-04-29
Payer: MEDICARE

## 2024-04-29 VITALS — HEIGHT: 66 IN | BODY MASS INDEX: 24.91 KG/M2 | WEIGHT: 155 LBS

## 2024-04-29 DIAGNOSIS — H90.3 SENSORINEURAL HEARING LOSS, BILATERAL: ICD-10-CM

## 2024-04-29 DIAGNOSIS — H61.23 IMPACTED CERUMEN, BILATERAL: ICD-10-CM

## 2024-04-29 DIAGNOSIS — J01.80 OTHER ACUTE SINUSITIS: ICD-10-CM

## 2024-04-29 PROCEDURE — 69210 REMOVE IMPACTED EAR WAX UNI: CPT

## 2024-04-29 PROCEDURE — 99214 OFFICE O/P EST MOD 30 MIN: CPT | Mod: 25

## 2024-04-29 PROCEDURE — 31231 NASAL ENDOSCOPY DX: CPT

## 2024-04-29 NOTE — ASSESSMENT
[FreeTextEntry1] : I personally reviewed, interpreted and discussed patient's MRI images. No CPA mass.   Wax found and cleaned. Cleaning well tolerated by patient. Patient felt improvement in cloginess after cleaning.  Continue Xyzal.

## 2024-04-29 NOTE — REASON FOR VISIT
[Subsequent Evaluation] : a subsequent evaluation for [FreeTextEntry2] : recurrent sinus pain and pressure ,  PND

## 2024-04-29 NOTE — PROCEDURE
[Recalcitrant Symptoms] : recalcitrant symptoms  [Anterior rhinoscopy insufficient to account for symptoms] : anterior rhinoscopy insufficient to account for symptoms [None] : none [Rigid Endoscope] : examined with a rigid endoscope [Congested] : congested [Deviated to the Rt] : deviated to the right [Normal] : the paranasal sinuses had no abnormalities

## 2024-04-29 NOTE — PHYSICAL EXAM
[Normal] : mucosa is normal [Midline] : trachea located in midline position [de-identified] : bilateral impacted wax cleaned with curette

## 2024-04-29 NOTE — HISTORY OF PRESENT ILLNESS
[Periorbital Swelling] : periorbital swelling [FreeTextEntry1] : Patient presents today c/o recurrent sinus pain and pressure ,  PND .  Patient states she has been having these symptoms and frequent sinus infections. Symptoms started in March.  She is feeling better but feels like the left side of cheek still feels swollen. Has sore throat, nasal congestion, watery eyes, PND. Has been using antihistamines and nasal sprays. Has been on antibiotics (clindamycin)  with improvement and stopped Flonase with. Was on Montelukast. Has had improvement with acupuncture. Feel some swelling around the orbital on the left side of her face. Does not feel nasal congestion of stuffiness. Was switched from Zyrtec to Xyzal.   Very happy with hearing aids.  [Facial Pain] : no facial pain [Headache] : no headache [Facial Pressure] : no facial pressure [Retro-Orbital Pain] : no retro-orbital pain [Nasal Congestion] : no nasal congestion [Dental Pain] : no dental pain [Sore Throat] : no sore throat [de-identified] : Periorbital swelling of left side.

## 2024-09-13 ENCOUNTER — APPOINTMENT (OUTPATIENT)
Dept: OBGYN | Facility: CLINIC | Age: 69
End: 2024-09-13
Payer: MEDICARE

## 2024-09-13 VITALS
DIASTOLIC BLOOD PRESSURE: 78 MMHG | HEART RATE: 82 BPM | WEIGHT: 168 LBS | SYSTOLIC BLOOD PRESSURE: 126 MMHG | HEIGHT: 65 IN | BODY MASS INDEX: 27.99 KG/M2 | OXYGEN SATURATION: 97 % | TEMPERATURE: 98 F

## 2024-09-13 DIAGNOSIS — N95.2 POSTMENOPAUSAL ATROPHIC VAGINITIS: ICD-10-CM

## 2024-09-13 DIAGNOSIS — Z01.419 ENCOUNTER FOR GYNECOLOGICAL EXAMINATION (GENERAL) (ROUTINE) W/OUT ABNORMAL FINDINGS: ICD-10-CM

## 2024-09-13 DIAGNOSIS — N89.8 OTHER SPECIFIED NONINFLAMMATORY DISORDERS OF VAGINA: ICD-10-CM

## 2024-09-13 LAB
BILIRUB UR QL STRIP: NEGATIVE
CLARITY UR: NORMAL
COLLECTION METHOD: NORMAL
GLUCOSE UR-MCNC: NEGATIVE
HCG UR QL: 0.2 EU/DL
HGB UR QL STRIP.AUTO: NEGATIVE
KETONES UR-MCNC: NEGATIVE
LEUKOCYTE ESTERASE UR QL STRIP: NORMAL
NITRITE UR QL STRIP: NEGATIVE
PH UR STRIP: 6.5
PROT UR STRIP-MCNC: NEGATIVE
SP GR UR STRIP: 1.02

## 2024-09-13 PROCEDURE — 81003 URINALYSIS AUTO W/O SCOPE: CPT | Mod: QW

## 2024-09-13 PROCEDURE — 99397 PER PM REEVAL EST PAT 65+ YR: CPT | Mod: 25

## 2024-09-13 RX ORDER — CLOTRIMAZOLE AND BETAMETHASONE DIPROPIONATE 10; .5 MG/G; MG/G
1-0.05 CREAM TOPICAL TWICE DAILY
Qty: 1 | Refills: 2 | Status: ACTIVE | COMMUNITY
Start: 2024-09-13 | End: 1900-01-01

## 2024-09-13 RX ORDER — METRONIDAZOLE 7.5 MG/G
0.75 GEL VAGINAL
Qty: 1 | Refills: 1 | Status: ACTIVE | COMMUNITY
Start: 2024-09-13 | End: 1900-01-01

## 2024-09-13 NOTE — HISTORY OF PRESENT ILLNESS
[LMP unknown] : LMP unknown [Yes] : pregnancy [unknown] : Patient is unsure of the date of her LMP [FreeTextEntry1] : PATIENT PRESENT FOR ANNUAL GYN EXAM [TextBox_4] : pt presents for annual with c/o vaginal itching on and off  hx- br ca with lumpectomy and  uterine ca with hysterectomy  pt does f/u with dr. Cruz and is currently on anastrozole x 5years and will be on another 5 years  pt also takes prolia 2 x year fo osteopenia  [Mammogramdate] : 6/24 [PapSmeardate] : 6/16/23 [BoneDensityDate] : 2023 [ColonoscopyDate] : 2022

## 2024-09-13 NOTE — PROCEDURE
[Vaginal Pap Smear] : vaginal Pap smear [Liquid Base] : liquid base [Tolerated Well] : the patient tolerated the procedure well [No Complications] : there were no complications [de-identified] : vag cx

## 2024-09-13 NOTE — PLAN
Diabetes Consult Daily  Progress Note          Assessment/Plan:                          Chela Love is a 41 year old female with a history of morbid obesity, TIIDM, HTN, asthma, and polysubstance abuse who presented initially to [facility].  At time of initial assessment, she was having nausea, vomiting, and vague abdominal pain, also found to have a large ovarian mass with ascites and peritoneal carcinomatosis. She was transferred to [facility] 11/23/19 in septic shock, for GYN ONC consult and surgical evaluation. She was taken to OR on 11/27 for ex-lap, ZANDRA, BSO, omentectomy, tumor debulking and abdominal washout. Blas purulence of the abdominal cavity was noted. She had septic/hypovolemic shock requiring pressors. Initial path is consistent with adenocarcinoma. She was started preoperatively on insulin drip for hyperglycemia.     Assessment:   Type II Diabetes, uncontrolled (A1c 15%)  Plan:  -lantus 25 units in the morning, will increase to 30 units starting Tuesday morning  -novolog 1 unit per 5 grams of CHO for meals and snacks  -novolog medium intensity sliding scale before meals and HS, change to high intensity sliding scale  - diabetes education prior to discharge for new insulin regimen  - due to renal function, cannot safely resume Metformin at this time, will reassess prior to discharge along with the use of GLP1 agonist, if appropriate  -monitor glucose before meals, HS and 0200  -hypoglycemia protocol  -will do a test claim on Tuesday to see what is covered by her insurance  -will continue to follow     Outpatient diabetes follow up:: on discharge, will recommend she establishes endocrine care (she prefers a clinic closer to her home, as she doesn't drive   Plan discussed with patient           Interval History:   The last 24 hours progress and nursing notes reviewed.  transitioned off IV insulin  Blood sugars are moderately controlled on current regime  Glucose at 0326, 165 and am  "glucose 196/201  glucose before meals in the 200's.  Appetite is good  Working with therapies      PTA:  -was not taking Lantus due to cost        Recent Labs   Lab 12/02/19  1651 12/02/19  1438 12/02/19  1257 12/02/19  0801 12/02/19  0725 12/02/19  0326 12/01/19  2155  12/01/19  0825  11/30/19  0330  11/29/19  1514  11/29/19  0248  11/28/19  2049   GLC  --   --   --   --  196*  --   --   --  115*  --  149*  --  123*  --  152*  --  196*   * 253* 260* 201*  --  165* 213*   < >  --    < >  --    < >  --    < >  --    < >  --     < > = values in this interval not displayed.               Review of Systems:   See interval hx          Medications:     Orders Placed This Encounter      Low Consistent CHO Diet       Physical Exam:  Gen: Alert, sitting in chair, NAD   HEENT:  mucous membranes are moist  Resp: non-labored  Ext: moving all extremteis   Neuro:oriented x3, communicating clearly  /73 (BP Location: Left arm)   Pulse 86   Temp 97.5  F (36.4  C) (Oral)   Resp 18   Ht 1.676 m (5' 6\")   Wt 109.5 kg (241 lb 6.4 oz)   LMP 10/27/2019   SpO2 94%   Breastfeeding No   BMI 38.96 kg/m             Data:     Lab Results   Component Value Date    A1C 15.0 11/23/2019              CBC RESULTS:   Recent Labs   Lab Test 12/02/19  0725   WBC 11.7*   RBC 2.69*   HGB 7.7*   HCT 25.0*   MCV 93   MCH 28.6   MCHC 30.8*   RDW 15.4*        Recent Labs   Lab Test 12/02/19  0725 12/01/19  0825    139   POTASSIUM 4.5 4.5   CHLORIDE 111* 111*   CO2 24 27   ANIONGAP 6 <1*   * 115*   BUN 43* 53*   CR 1.76* 2.15*   MARILIN 8.0* 7.6*     Liver Function Studies -   Recent Labs   Lab Test 11/30/19  0330   PROTTOTAL 5.2*   ALBUMIN 1.5*   BILITOTAL 0.4   ALKPHOS 293*   AST 20   ALT 11     Lab Results   Component Value Date    INR 1.41 12/01/2019    INR 1.44 11/30/2019    INR 1.45 11/29/2019    INR 1.57 11/29/2019    INR 1.69 11/28/2019    INR 1.83 11/27/2019    INR 1.81 11/27/2019    INR 1.76 11/27/2019    INR " 1.75 11/27/2019    INR 1.55 11/25/2019    INR 1.59 11/23/2019             Val Bull -8058  Diabetes Management job code 0241             [FreeTextEntry1] : annual with pap/vag cx  will give clotrimazole cream to be used as directed  will also give metrogel vaginal cream as directed  continue d/u with dr. Anthony mohan with vit d  f/u results and rto 1 month as per dr. Ibanez

## 2024-09-13 NOTE — PHYSICAL EXAM
[Chaperone Present] : A chaperone was present in the examining room during all aspects of the physical examination [Appropriately responsive] : appropriately responsive [Alert] : alert [No Acute Distress] : no acute distress [Soft] : soft [Non-tender] : non-tender [Oriented x3] : oriented x3 [Labia Majora] : normal [Labia Minora] : normal [Normal] : normal [Discharge] : a  ~M vaginal discharge was present [Scant] : scant [Brown] : brown [Thin] : thin [Blood-Tinged] : blood-tinged [Absent] : absent [Uterine Adnexae] : absent [FreeTextEntry6] : declines as will have exam with breast doctor next month

## 2024-09-17 LAB
A VAGINAE DNA VAG QL NAA+PROBE: NORMAL
BVAB2 DNA VAG QL NAA+PROBE: NORMAL
C KRUSEI DNA VAG QL NAA+PROBE: NEGATIVE
C TRACH RRNA SPEC QL NAA+PROBE: NEGATIVE
CANDIDA DNA VAG QL NAA+PROBE: NEGATIVE
HPV HIGH+LOW RISK DNA PNL CVX: NOT DETECTED
MEGA1 DNA VAG QL NAA+PROBE: NORMAL
N GONORRHOEA RRNA SPEC QL NAA+PROBE: NEGATIVE
T VAGINALIS RRNA SPEC QL NAA+PROBE: NEGATIVE

## 2024-10-15 ENCOUNTER — APPOINTMENT (OUTPATIENT)
Dept: OBGYN | Facility: CLINIC | Age: 69
End: 2024-10-15
Payer: MEDICARE

## 2024-10-15 VITALS
TEMPERATURE: 98 F | SYSTOLIC BLOOD PRESSURE: 120 MMHG | DIASTOLIC BLOOD PRESSURE: 80 MMHG | HEART RATE: 72 BPM | OXYGEN SATURATION: 97 % | WEIGHT: 168 LBS | BODY MASS INDEX: 27.96 KG/M2

## 2024-10-15 DIAGNOSIS — N89.8 OTHER SPECIFIED NONINFLAMMATORY DISORDERS OF VAGINA: ICD-10-CM

## 2024-10-15 LAB
BILIRUB UR QL STRIP: NEGATIVE
CLARITY UR: NORMAL
COLLECTION METHOD: NORMAL
GLUCOSE UR-MCNC: NEGATIVE
HCG UR QL: 0.2 EU/DL
HGB UR QL STRIP.AUTO: NEGATIVE
KETONES UR-MCNC: NEGATIVE
LEUKOCYTE ESTERASE UR QL STRIP: NEGATIVE
NITRITE UR QL STRIP: NEGATIVE
PH UR STRIP: 5.5
PROT UR STRIP-MCNC: NEGATIVE
SP GR UR STRIP: 1.01

## 2024-10-15 PROCEDURE — 99213 OFFICE O/P EST LOW 20 MIN: CPT | Mod: 25

## 2024-10-15 PROCEDURE — 81003 URINALYSIS AUTO W/O SCOPE: CPT | Mod: QW

## 2024-11-07 ENCOUNTER — APPOINTMENT (OUTPATIENT)
Dept: PLASTIC SURGERY | Facility: CLINIC | Age: 69
End: 2024-11-07
Payer: MEDICARE

## 2024-11-07 VITALS — BODY MASS INDEX: 27.98 KG/M2 | WEIGHT: 170 LBS | HEIGHT: 65.5 IN

## 2024-11-07 PROCEDURE — 99203 OFFICE O/P NEW LOW 30 MIN: CPT

## 2024-12-23 ENCOUNTER — OUTPATIENT (OUTPATIENT)
Dept: OUTPATIENT SERVICES | Facility: HOSPITAL | Age: 69
LOS: 1 days | End: 2024-12-23
Payer: MEDICARE

## 2024-12-23 VITALS
OXYGEN SATURATION: 96 % | RESPIRATION RATE: 16 BRPM | TEMPERATURE: 98 F | HEIGHT: 65 IN | WEIGHT: 166.45 LBS | DIASTOLIC BLOOD PRESSURE: 78 MMHG | HEART RATE: 72 BPM | SYSTOLIC BLOOD PRESSURE: 128 MMHG

## 2024-12-23 DIAGNOSIS — Z90.710 ACQUIRED ABSENCE OF BOTH CERVIX AND UTERUS: Chronic | ICD-10-CM

## 2024-12-23 DIAGNOSIS — C44.311 BASAL CELL CARCINOMA OF SKIN OF NOSE: ICD-10-CM

## 2024-12-23 DIAGNOSIS — Z01.818 ENCOUNTER FOR OTHER PREPROCEDURAL EXAMINATION: ICD-10-CM

## 2024-12-23 DIAGNOSIS — Z98.890 OTHER SPECIFIED POSTPROCEDURAL STATES: Chronic | ICD-10-CM

## 2024-12-23 DIAGNOSIS — Z90.89 ACQUIRED ABSENCE OF OTHER ORGANS: Chronic | ICD-10-CM

## 2024-12-23 PROBLEM — M81.0 AGE-RELATED OSTEOPOROSIS WITHOUT CURRENT PATHOLOGICAL FRACTURE: Chronic | Status: INACTIVE | Noted: 2020-06-01 | Resolved: 2024-12-23

## 2024-12-23 LAB
ALBUMIN SERPL ELPH-MCNC: 4.6 G/DL — SIGNIFICANT CHANGE UP (ref 3.5–5.2)
ALP SERPL-CCNC: 76 U/L — SIGNIFICANT CHANGE UP (ref 30–115)
ALT FLD-CCNC: 20 U/L — SIGNIFICANT CHANGE UP (ref 0–41)
ANION GAP SERPL CALC-SCNC: 10 MMOL/L — SIGNIFICANT CHANGE UP (ref 7–14)
AST SERPL-CCNC: 22 U/L — SIGNIFICANT CHANGE UP (ref 0–41)
BASOPHILS # BLD AUTO: 0.06 K/UL — SIGNIFICANT CHANGE UP (ref 0–0.2)
BASOPHILS NFR BLD AUTO: 0.5 % — SIGNIFICANT CHANGE UP (ref 0–1)
BILIRUB SERPL-MCNC: 0.3 MG/DL — SIGNIFICANT CHANGE UP (ref 0.2–1.2)
BUN SERPL-MCNC: 23 MG/DL — HIGH (ref 10–20)
CALCIUM SERPL-MCNC: 10.6 MG/DL — HIGH (ref 8.4–10.5)
CHLORIDE SERPL-SCNC: 98 MMOL/L — SIGNIFICANT CHANGE UP (ref 98–110)
CO2 SERPL-SCNC: 29 MMOL/L — SIGNIFICANT CHANGE UP (ref 17–32)
CREAT SERPL-MCNC: 0.8 MG/DL — SIGNIFICANT CHANGE UP (ref 0.7–1.5)
EGFR: 79 ML/MIN/1.73M2 — SIGNIFICANT CHANGE UP
EOSINOPHIL # BLD AUTO: 0.06 K/UL — SIGNIFICANT CHANGE UP (ref 0–0.7)
EOSINOPHIL NFR BLD AUTO: 0.5 % — SIGNIFICANT CHANGE UP (ref 0–8)
GLUCOSE SERPL-MCNC: 88 MG/DL — SIGNIFICANT CHANGE UP (ref 70–99)
HCT VFR BLD CALC: 51.3 % — HIGH (ref 37–47)
HGB BLD-MCNC: 16.9 G/DL — HIGH (ref 12–16)
IMM GRANULOCYTES NFR BLD AUTO: 0.3 % — SIGNIFICANT CHANGE UP (ref 0.1–0.3)
LYMPHOCYTES # BLD AUTO: 1.03 K/UL — LOW (ref 1.2–3.4)
LYMPHOCYTES # BLD AUTO: 9.4 % — LOW (ref 20.5–51.1)
MCHC RBC-ENTMCNC: 32.9 G/DL — SIGNIFICANT CHANGE UP (ref 32–37)
MCHC RBC-ENTMCNC: 33.1 PG — HIGH (ref 27–31)
MCV RBC AUTO: 100.6 FL — HIGH (ref 81–99)
MONOCYTES # BLD AUTO: 0.69 K/UL — HIGH (ref 0.1–0.6)
MONOCYTES NFR BLD AUTO: 6.3 % — SIGNIFICANT CHANGE UP (ref 1.7–9.3)
NEUTROPHILS # BLD AUTO: 9.14 K/UL — HIGH (ref 1.4–6.5)
NEUTROPHILS NFR BLD AUTO: 83 % — HIGH (ref 42.2–75.2)
NRBC # BLD: 0 /100 WBCS — SIGNIFICANT CHANGE UP (ref 0–0)
PLATELET # BLD AUTO: 254 K/UL — SIGNIFICANT CHANGE UP (ref 130–400)
PMV BLD: 11.4 FL — HIGH (ref 7.4–10.4)
POTASSIUM SERPL-MCNC: 4.4 MMOL/L — SIGNIFICANT CHANGE UP (ref 3.5–5)
POTASSIUM SERPL-SCNC: 4.4 MMOL/L — SIGNIFICANT CHANGE UP (ref 3.5–5)
PROT SERPL-MCNC: 7.3 G/DL — SIGNIFICANT CHANGE UP (ref 6–8)
RBC # BLD: 5.1 M/UL — SIGNIFICANT CHANGE UP (ref 4.2–5.4)
RBC # FLD: 12.2 % — SIGNIFICANT CHANGE UP (ref 11.5–14.5)
SODIUM SERPL-SCNC: 137 MMOL/L — SIGNIFICANT CHANGE UP (ref 135–146)
WBC # BLD: 11.01 K/UL — HIGH (ref 4.8–10.8)
WBC # FLD AUTO: 11.01 K/UL — HIGH (ref 4.8–10.8)

## 2024-12-23 PROCEDURE — 93005 ELECTROCARDIOGRAM TRACING: CPT

## 2024-12-23 PROCEDURE — 36415 COLL VENOUS BLD VENIPUNCTURE: CPT

## 2024-12-23 PROCEDURE — 93010 ELECTROCARDIOGRAM REPORT: CPT

## 2024-12-23 PROCEDURE — 99214 OFFICE O/P EST MOD 30 MIN: CPT | Mod: 25

## 2024-12-23 PROCEDURE — 85025 COMPLETE CBC W/AUTO DIFF WBC: CPT

## 2024-12-23 PROCEDURE — 80053 COMPREHEN METABOLIC PANEL: CPT

## 2024-12-23 NOTE — H&P PST ADULT - NSICDXFAMILYHX_GEN_ALL_CORE_FT
FAMILY HISTORY:  Father  Still living? No  Family history of atrial fibrillation, Age at diagnosis: Age Unknown    Sibling  Still living? Yes, Estimated age: Age Unknown  FH: aortic aneurysm, Age at diagnosis: Age Unknown

## 2024-12-23 NOTE — H&P PST ADULT - NSICDXPASTSURGICALHX_GEN_ALL_CORE_FT
PAST SURGICAL HISTORY:  H/O vaginal hysterectomy     History of dilatation and curettage 2020    S/P lumpectomy, right breast 2019    S/P tonsillectomy

## 2024-12-23 NOTE — H&P PST ADULT - NSICDXPASTMEDICALHX_GEN_ALL_CORE_FT
PAST MEDICAL HISTORY:  Breast cancer 2019 rt lumpectomy dcis s/p rt on anastozole    H/O osteopenia     HTN (hypertension) 2 yr     PAST MEDICAL HISTORY:  Breast cancer 2019 rt lumpectomy dcis s/p rt on anastozole    Elevated hematocrit     H/O osteopenia     HTN (hypertension) 2 yr

## 2024-12-23 NOTE — H&P PST ADULT - HISTORY OF PRESENT ILLNESS
pt with h/o right breast dcis s/p right lumpectomy 2019 s/p rad tx 7/19  on anastrozole who presents to [facility] for the above surgery due to BCC on right nasal.   Patient denies any cp, sob, palpitations, fever, cough, URI, abdominal pains, N/V, UTI, Rashes or open wounds.  As per patient exercise tolerance of 2-3 fos walks with out sob  Patient denies any s/s covid 19 and reports no contact with known positive people.   Anesthesia Alert  NO--Difficult Airway  NO--History of neck surgery or radiation  NO--Limited ROM of neck  NO--History of Malignant hyperthermia  NO--Personal or family history of Pseudocholinesterase deficiency  NO--Prior Anesthesia Complication  NO--Latex Allergy  NO--Loose teeth  NO--History of Rheumatoid Arthritis  NO--THOM  NO--Risk of Bleedings   Pt instructed to stop vitamins/supplements/herbal medications for one week prior to surgery  As per patient this is the complete medical, surgical history and medications.  Duke Activity Status Index (DASI) from [employer]  on 12/23/2024  ** All calculations should be rechecked by clinician prior to use **  RESULT SUMMARY:  36.7 points  The higher the score (maximum 58.2), the higher the functional status.  7.25 METs  INPUTS:  Take care of self —> 2.75 = Yes  Walk indoors —> 1.75 = Yes  Walk 1&ndash;2 blocks on level ground —> 2.75 = Yes  Climb a flight of stairs or walk up a hill —> 5.5 = Yes  Run a short distance —> 8 = Yes  Do light work around the house —> 2.7 = Yes  Do moderate work around the house —> 3.5 = Yes  Do heavy work around the house —> 0 = No  Do yardwork —> 4.5 = Yes  Have sexual relations —> 5.25 = Yes  Participate in moderate recreational activities —> 0 = No  Participate in strenuous sports —> 0 = No  Revised Cardiac Risk Index for Pre-Operative Risk from [employer]  on 12/23/2024  ** All calculations should be rechecked by clinician prior to use **  RESULT SUMMARY:  0 points  RCRI Score  3.9 %  Risk of major cardiac event  INPUTS:  High-risk surgery —> 0 = No  History of ischemic heart disease —> 0 = No  History of congestive heart failure —> 0 = No  History of cerebrovascular disease —> 0 = No  Pre-operative treatment with insulin —> 0 = No  Pre-operative creatinine >2 mg/dL / 176.8 µmol/L —> 0 = No       Patient is a 68 Yo female with PMH of HTN, Osteopenia, right breast dcis s/p right lumpectomy 2019 s/p rad on anastrozole who presents to [facility] for the above surgery due to BCC on right side of the nose. Pending Mohs surgery on 1/7/2024.    Patient denies any cp, sob, palpitations, fever, cough, URI, abdominal pains, N/V, UTI, Rashes or open wounds.  As per patient exercise tolerance of 2-3 fos walks with out sob  Patient denies any s/s covid 19 and reports no contact with known positive people.   Anesthesia Alert  NO--Difficult Airway  NO--History of neck surgery or radiation  NO--Limited ROM of neck  NO--History of Malignant hyperthermia  NO--Personal or family history of Pseudocholinesterase deficiency  NO--Prior Anesthesia Complication  NO--Latex Allergy  NO--Loose teeth  NO--History of Rheumatoid Arthritis  NO--THOM  NO--Risk of Bleedings   Right arm precautions  Pt instructed to stop vitamins/supplements/herbal medications for one week prior to surgery  As per patient this is the complete medical, surgical history and medications.  Duke Activity Status Index (DASI) from [employer]  on 12/23/2024  ** All calculations should be rechecked by clinician prior to use **  RESULT SUMMARY:  36.7 points  The higher the score (maximum 58.2), the higher the functional status.  7.25 METs  INPUTS:  Take care of self —> 2.75 = Yes  Walk indoors —> 1.75 = Yes  Walk 1&ndash;2 blocks on level ground —> 2.75 = Yes  Climb a flight of stairs or walk up a hill —> 5.5 = Yes  Run a short distance —> 8 = Yes  Do light work around the house —> 2.7 = Yes  Do moderate work around the house —> 3.5 = Yes  Do heavy work around the house —> 0 = No  Do yard work —> 4.5 = Yes  Have sexual relations —> 5.25 = Yes  Participate in moderate recreational activities —> 0 = No  Participate in strenuous sports —> 0 = No  Revised Cardiac Risk Index for Pre-Operative Risk from [employer]  on 12/23/2024  ** All calculations should be rechecked by clinician prior to use **  RESULT SUMMARY:  0 points  RCRI Score  3.9 %  Risk of major cardiac event  INPUTS:  High-risk surgery —> 0 = No  History of ischemic heart disease —> 0 = No  History of congestive heart failure —> 0 = No  History of cerebrovascular disease —> 0 = No  Pre-operative treatment with insulin —> 0 = No  Pre-operative creatinine >2 mg/dL / 176.8 µmol/L —> 0 = No

## 2024-12-23 NOTE — H&P PST ADULT - IS PATIENT PREGNANT?
Patient contacted for a postoperative follow up assessment  Patient reports 0-1/10 pain when sitting and 8/10 when walking with RW  Patient states I have aching pain when I walk   Patient confirmed post-op PT appointment, 8/11 at 10:45AM       Patient is taking Tylenol 1000mg TID, ASA 325mg BID, Oxycodone 5mg PRN (before therapy)  Patient denies use of stool softener  Patient has had a small BM and small amount of gas  Patient denies increase in swelling and dressing is clean, dry and intact  Patient is icing the site regularly  Patient denies nausea, vomiting, abdominal pain, chest pain, shortness of breath, fever, dizziness and calf pain   Patient confirmed post-op appointment with surgeon on 8/23 at 10:45AM  Patient does not have any other questions or concerns at this time
no

## 2024-12-23 NOTE — H&P PST ADULT - SKIN
warm and dry/color normal/normal/no rashes/no ulcers Right side of the nose BCC s/o biopsy site healed well./warm and dry/color normal/no rashes/no ulcers

## 2024-12-23 NOTE — H&P PST ADULT - REASON FOR ADMISSION
Case Type: OP Block TimeSuite: CASProceduralist: Norman Davalos  Confirmed Surgery DateTime: 01- - 0:00PAST DateTime: 12- - 8:15Procedure: RIGHT NASAL RECONSTRUCTION WITH LOCAL FLAP OR FULL THICKNESS SKIN GRAFT FROM RIGHT OR LEFT FACE  ERP?: UnavailableLaterality: RightLength of Procedure: 90 Minutes  Anesthesia Type: General Case Type: OP  Suite: SAE  Proceduralist: Norman Davalos  Confirmed Surgery Date  Time: 01-   PAST Date Time: 12- - 8:15  Procedure: RIGHT NASAL RECONSTRUCTION WITH LOCAL FLAP OR FULL THICKNESS SKIN GRAFT FROM RIGHT OR LEFT FACE  Laterality: Right  Length of Procedure: 90 Minutes  Anesthesia Type: General

## 2024-12-24 DIAGNOSIS — Z01.818 ENCOUNTER FOR OTHER PREPROCEDURAL EXAMINATION: ICD-10-CM

## 2024-12-24 DIAGNOSIS — C44.311 BASAL CELL CARCINOMA OF SKIN OF NOSE: ICD-10-CM

## 2025-01-07 NOTE — ASU PATIENT PROFILE, ADULT - FALL HARM RISK - ATTEMPT OOB
Patient resting comfortably in the stretcher and in no acute distress. Awaiting CT results.      Say Juarez, RN  03/17/20 3446
Pt to CT via stretcher.       Clelia Burkitt, LAUREN  03/17/20 1242
Report given to Elizabeth Pereira RN.       Mahamed Parmar RN  03/17/20 9025
No

## 2025-01-08 ENCOUNTER — TRANSCRIPTION ENCOUNTER (OUTPATIENT)
Age: 70
End: 2025-01-08

## 2025-01-08 ENCOUNTER — APPOINTMENT (OUTPATIENT)
Dept: PLASTIC SURGERY | Facility: AMBULATORY SURGERY CENTER | Age: 70
End: 2025-01-08
Payer: MEDICARE

## 2025-01-08 ENCOUNTER — OUTPATIENT (OUTPATIENT)
Dept: OUTPATIENT SERVICES | Facility: HOSPITAL | Age: 70
LOS: 1 days | Discharge: HOME | End: 2025-01-08
Payer: MEDICARE

## 2025-01-08 VITALS
HEART RATE: 90 BPM | DIASTOLIC BLOOD PRESSURE: 67 MMHG | RESPIRATION RATE: 19 BRPM | OXYGEN SATURATION: 98 % | SYSTOLIC BLOOD PRESSURE: 119 MMHG

## 2025-01-08 VITALS
HEIGHT: 65 IN | SYSTOLIC BLOOD PRESSURE: 142 MMHG | DIASTOLIC BLOOD PRESSURE: 80 MMHG | TEMPERATURE: 98 F | HEART RATE: 77 BPM | WEIGHT: 166.45 LBS | RESPIRATION RATE: 18 BRPM | OXYGEN SATURATION: 97 %

## 2025-01-08 DIAGNOSIS — Z90.710 ACQUIRED ABSENCE OF BOTH CERVIX AND UTERUS: Chronic | ICD-10-CM

## 2025-01-08 DIAGNOSIS — Z90.89 ACQUIRED ABSENCE OF OTHER ORGANS: Chronic | ICD-10-CM

## 2025-01-08 DIAGNOSIS — Z98.890 OTHER SPECIFIED POSTPROCEDURAL STATES: Chronic | ICD-10-CM

## 2025-01-08 DIAGNOSIS — C44.311 BASAL CELL CARCINOMA OF SKIN OF NOSE: ICD-10-CM

## 2025-01-08 PROCEDURE — 14060 TIS TRNFR E/N/E/L 10 SQ CM/<: CPT

## 2025-01-08 PROCEDURE — C9399: CPT

## 2025-01-08 RX ORDER — DENOSUMAB 60 MG/ML
60 INJECTION SUBCUTANEOUS
Refills: 0 | DISCHARGE

## 2025-01-08 RX ORDER — ANASTROZOLE 1 MG/1
1 TABLET ORAL
Refills: 0 | DISCHARGE

## 2025-01-08 RX ORDER — CETIRIZINE HYDROCHLORIDE 5 MG/5ML
1 SYRUP ORAL
Refills: 0 | DISCHARGE

## 2025-01-08 RX ORDER — TRAMADOL HYDROCHLORIDE 50 MG/1
1 TABLET ORAL
Qty: 10 | Refills: 0
Start: 2025-01-08 | End: 2025-01-10

## 2025-01-08 RX ORDER — ACETAMINOPHEN 80 MG/.8ML
1000 SOLUTION/ DROPS ORAL ONCE
Refills: 0 | Status: DISCONTINUED | OUTPATIENT
Start: 2025-01-08 | End: 2025-01-08

## 2025-01-08 RX ORDER — AMLODIPINE AND OLMESARTAN MEDOXOMIL 10; 40 MG/1; MG/1
1 TABLET ORAL
Refills: 0 | DISCHARGE

## 2025-01-08 RX ORDER — ONDANSETRON 4 MG/1
4 TABLET ORAL ONCE
Refills: 0 | Status: DISCONTINUED | OUTPATIENT
Start: 2025-01-08 | End: 2025-01-08

## 2025-01-08 RX ORDER — DOXYCYCLINE MONOHYDRATE 100 MG
1 TABLET ORAL
Qty: 10 | Refills: 0
Start: 2025-01-08 | End: 2025-01-12

## 2025-01-08 RX ORDER — HYDROMORPHONE HCL 4 MG
0.5 TABLET ORAL
Refills: 0 | Status: DISCONTINUED | OUTPATIENT
Start: 2025-01-08 | End: 2025-01-08

## 2025-01-08 RX ORDER — SODIUM CHLORIDE 9 MG/ML
1000 INJECTION, SOLUTION INTRAVENOUS
Refills: 0 | Status: DISCONTINUED | OUTPATIENT
Start: 2025-01-08 | End: 2025-01-08

## 2025-01-08 RX ORDER — OXYCODONE HCL 15 MG
5 TABLET ORAL ONCE
Refills: 0 | Status: DISCONTINUED | OUTPATIENT
Start: 2025-01-08 | End: 2025-01-08

## 2025-01-08 NOTE — ASU DISCHARGE PLAN (ADULT/PEDIATRIC) - PAIN MANAGEMENT
Doxycycline, Tramadol for severe pain/Take over the counter pain medication/Prescriptions electronically submitted to pharmacy from [facility]

## 2025-01-08 NOTE — ASU DISCHARGE PLAN (ADULT/PEDIATRIC) - ASU DC SPECIAL INSTRUCTIONSFT
Keep surgical site clean and dry.   Do not remove any dressings or get them wet.   Sleep on your back with head elevated to reduce swelling.   Do not be alarmed by facial bruising, swelling and minor bleeding, that's expected.   May apply ice pack on-and-off for the first 48 hours to the nose to reduce swelling.     Rest, no lifting.

## 2025-01-08 NOTE — ASU DISCHARGE PLAN (ADULT/PEDIATRIC) - FINANCIAL ASSISTANCE
[facility] provides services at a reduced cost to those who are determined to be eligible through [facility]’s financial assistance program. Information regarding [facility]’s financial assistance program can be found by going to [email or web address] or by calling [phone].

## 2025-01-08 NOTE — CHART NOTE - NSCHARTNOTEFT_GEN_A_CORE
PACU ANESTHESIA ADMISSION NOTE      Procedure:   Post op diagnosis:      ____  Intubated  TV:______       Rate: ______      FiO2: ______    __x__  Patent Airway    __x__  Full return of protective reflexes    __x__  Full recovery from anesthesia / back to baseline status    Vitals  HR: 98  BP: 121/59  RR: 18  O2 Sat: 94%  Temp: 97.8    Mental Status:  __x__ Awake   _____ Alert   _____ Drowsy   _____ Sedated    Nausea/Vomiting:  __x__ NO  ______Yes,   See Post - Op Orders          Pain Scale (0-10):  _____    Treatment: ____ None    __x__ See Post - Op/PCA Orders    Post - Operative Fluids:   ____ Oral   __x__ See Post - Op Orders    Plan: Discharge when criteria met:   __x__Home       _____Floor     _____Critical Care   Other:_________________    Comments: Patient had smooth intraoperative event, no anesthesia complication.

## 2025-01-08 NOTE — ASU DISCHARGE PLAN (ADULT/PEDIATRIC) - NS MD DC FALL RISK RISK
For information on Fall & Injury Prevention, visit: [email or web address] OR  [email or web address] OR  https://www.cdc.gov/steadi/patient.html

## 2025-01-08 NOTE — BRIEF OPERATIVE NOTE - NSICDXBRIEFPROCEDURE_GEN_ALL_CORE_FT
PROCEDURES:  Reconstruction of face after Mohs micrographic surgery 08-Jan-2025 08:48:32 right nasal Moh's reconstruction with bilobed flap Raegan Newell

## 2025-01-08 NOTE — ASU DISCHARGE PLAN (ADULT/PEDIATRIC) - CARE PROVIDER_API CALL
Norman Davalos  Plastic Surgery  [address] [place] [id number]  Phone: [phone]  Fax: [phone]  Follow Up Time:

## 2025-01-14 DIAGNOSIS — Z42.8 ENCOUNTER FOR OTHER PLASTIC AND RECONSTRUCTIVE SURGERY FOLLOWING MEDICAL PROCEDURE OR HEALED INJURY: ICD-10-CM

## 2025-01-14 DIAGNOSIS — I10 ESSENTIAL (PRIMARY) HYPERTENSION: ICD-10-CM

## 2025-01-14 DIAGNOSIS — Z85.828 PERSONAL HISTORY OF OTHER MALIGNANT NEOPLASM OF SKIN: ICD-10-CM

## 2025-01-14 DIAGNOSIS — Z85.3 PERSONAL HISTORY OF MALIGNANT NEOPLASM OF BREAST: ICD-10-CM

## 2025-01-15 ENCOUNTER — APPOINTMENT (OUTPATIENT)
Dept: PLASTIC SURGERY | Facility: CLINIC | Age: 70
End: 2025-01-15
Payer: MEDICARE

## 2025-01-15 DIAGNOSIS — M95.0 ACQUIRED DEFORMITY OF NOSE: ICD-10-CM

## 2025-01-15 PROCEDURE — 99024 POSTOP FOLLOW-UP VISIT: CPT

## 2025-01-29 ENCOUNTER — APPOINTMENT (OUTPATIENT)
Dept: PLASTIC SURGERY | Facility: CLINIC | Age: 70
End: 2025-01-29
Payer: MEDICARE

## 2025-01-29 DIAGNOSIS — M95.0 ACQUIRED DEFORMITY OF NOSE: ICD-10-CM

## 2025-01-29 PROCEDURE — 99024 POSTOP FOLLOW-UP VISIT: CPT

## 2025-02-13 ENCOUNTER — APPOINTMENT (OUTPATIENT)
Dept: PLASTIC SURGERY | Facility: CLINIC | Age: 70
End: 2025-02-13
Payer: MEDICARE

## 2025-02-13 DIAGNOSIS — M95.0 ACQUIRED DEFORMITY OF NOSE: ICD-10-CM

## 2025-02-13 PROCEDURE — 99024 POSTOP FOLLOW-UP VISIT: CPT

## 2025-02-18 DIAGNOSIS — N39.0 URINARY TRACT INFECTION, SITE NOT SPECIFIED: ICD-10-CM

## 2025-02-18 DIAGNOSIS — R31.9 URINARY TRACT INFECTION, SITE NOT SPECIFIED: ICD-10-CM

## 2025-02-18 RX ORDER — NITROFURANTOIN MACROCRYSTALS 100 MG/1
100 CAPSULE ORAL
Qty: 10 | Refills: 0 | Status: ACTIVE | COMMUNITY
Start: 2025-02-18 | End: 1900-01-01

## 2025-03-18 ENCOUNTER — APPOINTMENT (OUTPATIENT)
Dept: PLASTIC SURGERY | Facility: CLINIC | Age: 70
End: 2025-03-18
Payer: MEDICARE

## 2025-03-18 PROCEDURE — 99024 POSTOP FOLLOW-UP VISIT: CPT

## 2025-03-18 PROCEDURE — 11900 INJECT SKIN LESIONS </W 7: CPT | Mod: 58

## 2025-04-02 PROBLEM — N32.89 PAINFUL BLADDER SPASM: Status: ACTIVE | Noted: 2022-09-30

## 2025-04-28 NOTE — BRIEF OPERATIVE NOTE - NSICDXBRIEFPOSTOP_GEN_ALL_CORE_FT
Alert-The patient is alert, awake and responds to voice. The patient is oriented to time, place, and person. The triage nurse is able to obtain subjective information. POST-OP DIAGNOSIS:  Mohs defect of right nose 08-Jan-2025 08:46:45  Raegan Newell

## 2025-06-16 ENCOUNTER — NON-APPOINTMENT (OUTPATIENT)
Age: 70
End: 2025-06-16

## 2025-06-17 ENCOUNTER — APPOINTMENT (OUTPATIENT)
Dept: PLASTIC SURGERY | Facility: CLINIC | Age: 70
End: 2025-06-17
Payer: MEDICARE

## 2025-06-17 PROCEDURE — G2211 COMPLEX E/M VISIT ADD ON: CPT

## 2025-06-17 PROCEDURE — 99212 OFFICE O/P EST SF 10 MIN: CPT
